# Patient Record
Sex: FEMALE | Race: OTHER | NOT HISPANIC OR LATINO
[De-identification: names, ages, dates, MRNs, and addresses within clinical notes are randomized per-mention and may not be internally consistent; named-entity substitution may affect disease eponyms.]

---

## 2018-04-17 ENCOUNTER — TRANSCRIPTION ENCOUNTER (OUTPATIENT)
Age: 33
End: 2018-04-17

## 2019-06-28 ENCOUNTER — TRANSCRIPTION ENCOUNTER (OUTPATIENT)
Age: 34
End: 2019-06-28

## 2019-08-01 PROBLEM — Z00.00 ENCOUNTER FOR PREVENTIVE HEALTH EXAMINATION: Status: ACTIVE | Noted: 2019-08-01

## 2019-08-14 ENCOUNTER — OUTPATIENT (OUTPATIENT)
Dept: OUTPATIENT SERVICES | Facility: HOSPITAL | Age: 34
LOS: 1 days | Discharge: HOME | End: 2019-08-14
Payer: MEDICAID

## 2019-08-14 VITALS
HEART RATE: 75 BPM | SYSTOLIC BLOOD PRESSURE: 107 MMHG | TEMPERATURE: 98 F | HEIGHT: 63.5 IN | WEIGHT: 234.79 LBS | DIASTOLIC BLOOD PRESSURE: 76 MMHG | OXYGEN SATURATION: 98 % | RESPIRATION RATE: 18 BRPM

## 2019-08-14 DIAGNOSIS — Z98.890 OTHER SPECIFIED POSTPROCEDURAL STATES: Chronic | ICD-10-CM

## 2019-08-14 DIAGNOSIS — M25.511 PAIN IN RIGHT SHOULDER: ICD-10-CM

## 2019-08-14 DIAGNOSIS — Z01.818 ENCOUNTER FOR OTHER PREPROCEDURAL EXAMINATION: ICD-10-CM

## 2019-08-14 DIAGNOSIS — F41.9 ANXIETY DISORDER, UNSPECIFIED: ICD-10-CM

## 2019-08-14 DIAGNOSIS — F32.9 MAJOR DEPRESSIVE DISORDER, SINGLE EPISODE, UNSPECIFIED: ICD-10-CM

## 2019-08-14 DIAGNOSIS — J45.909 UNSPECIFIED ASTHMA, UNCOMPLICATED: ICD-10-CM

## 2019-08-14 LAB
ANION GAP SERPL CALC-SCNC: 6 MMOL/L — SIGNIFICANT CHANGE UP (ref 5–17)
APTT BLD: 37 SEC — SIGNIFICANT CHANGE UP (ref 28.5–37)
BASOPHILS # BLD AUTO: 0.05 K/UL — SIGNIFICANT CHANGE UP (ref 0–0.2)
BASOPHILS NFR BLD AUTO: 0.5 % — SIGNIFICANT CHANGE UP (ref 0–2)
BUN SERPL-MCNC: 10 MG/DL — SIGNIFICANT CHANGE UP (ref 7–23)
CALCIUM SERPL-MCNC: 9 MG/DL — SIGNIFICANT CHANGE UP (ref 8.5–10.1)
CHLORIDE SERPL-SCNC: 104 MMOL/L — SIGNIFICANT CHANGE UP (ref 96–108)
CO2 SERPL-SCNC: 27 MMOL/L — SIGNIFICANT CHANGE UP (ref 22–31)
CREAT SERPL-MCNC: 0.74 MG/DL — SIGNIFICANT CHANGE UP (ref 0.5–1.3)
EOSINOPHIL # BLD AUTO: 0.17 K/UL — SIGNIFICANT CHANGE UP (ref 0–0.5)
EOSINOPHIL NFR BLD AUTO: 1.8 % — SIGNIFICANT CHANGE UP (ref 0–6)
GLUCOSE SERPL-MCNC: 82 MG/DL — SIGNIFICANT CHANGE UP (ref 70–99)
HCG UR QL: NEGATIVE — SIGNIFICANT CHANGE UP
HCT VFR BLD CALC: 38.8 % — SIGNIFICANT CHANGE UP (ref 34.5–45)
HGB BLD-MCNC: 12.2 G/DL — SIGNIFICANT CHANGE UP (ref 11.5–15.5)
IMM GRANULOCYTES NFR BLD AUTO: 0.4 % — SIGNIFICANT CHANGE UP (ref 0–1.5)
INR BLD: 1.16 RATIO — SIGNIFICANT CHANGE UP (ref 0.88–1.16)
LYMPHOCYTES # BLD AUTO: 2.37 K/UL — SIGNIFICANT CHANGE UP (ref 1–3.3)
LYMPHOCYTES # BLD AUTO: 25.4 % — SIGNIFICANT CHANGE UP (ref 13–44)
MCHC RBC-ENTMCNC: 26.3 PG — LOW (ref 27–34)
MCHC RBC-ENTMCNC: 31.4 GM/DL — LOW (ref 32–36)
MCV RBC AUTO: 83.6 FL — SIGNIFICANT CHANGE UP (ref 80–100)
MONOCYTES # BLD AUTO: 0.51 K/UL — SIGNIFICANT CHANGE UP (ref 0–0.9)
MONOCYTES NFR BLD AUTO: 5.5 % — SIGNIFICANT CHANGE UP (ref 2–14)
NEUTROPHILS # BLD AUTO: 6.2 K/UL — SIGNIFICANT CHANGE UP (ref 1.8–7.4)
NEUTROPHILS NFR BLD AUTO: 66.4 % — SIGNIFICANT CHANGE UP (ref 43–77)
NRBC # BLD: 0 /100 WBCS — SIGNIFICANT CHANGE UP (ref 0–0)
PLATELET # BLD AUTO: 358 K/UL — SIGNIFICANT CHANGE UP (ref 150–400)
POTASSIUM SERPL-MCNC: 3.7 MMOL/L — SIGNIFICANT CHANGE UP (ref 3.5–5.3)
POTASSIUM SERPL-SCNC: 3.7 MMOL/L — SIGNIFICANT CHANGE UP (ref 3.5–5.3)
PROTHROM AB SERPL-ACNC: 13.1 SEC — HIGH (ref 10–12.9)
RBC # BLD: 4.64 M/UL — SIGNIFICANT CHANGE UP (ref 3.8–5.2)
RBC # FLD: 13.7 % — SIGNIFICANT CHANGE UP (ref 10.3–14.5)
SODIUM SERPL-SCNC: 137 MMOL/L — SIGNIFICANT CHANGE UP (ref 135–145)
WBC # BLD: 9.34 K/UL — SIGNIFICANT CHANGE UP (ref 3.8–10.5)
WBC # FLD AUTO: 9.34 K/UL — SIGNIFICANT CHANGE UP (ref 3.8–10.5)

## 2019-08-14 PROCEDURE — 93010 ELECTROCARDIOGRAM REPORT: CPT

## 2019-08-14 NOTE — H&P PST ADULT - HISTORY OF PRESENT ILLNESS
33F pmh Asthma (intubated 1- during surgery),  depression, anxiety, migraines c/o right shoulder pain after lifting grandmother off of toilet fouund to have -----here for PST for scheduled Right shoulder arthroscopy subscapularis, biceps tenodesis distal clavicle 33F pmh Asthma (intubated 1- during surgery),  depression, anxiety, migraines c/o right shoulder pain after lifting grandmother from toilet seat on patrcie day here for PST for scheduled Right shoulder arthroscopy subscapularis, biceps tenodesis distal clavicle

## 2019-08-14 NOTE — H&P PST ADULT - ASSESSMENT
33F pmh Asthma (intubated 2008 during surgery),  depression, anxiety, migraines c/o right shoulder pain after lifting grandmother from toilet seat on patrice day here for [facility] for scheduled Right shoulder arthroscopy subscapularis, biceps tenodesis distal clavicle   CAPRINI SCORE    AGE RELATED RISK FACTORS                                                       MOBILITY RELATED FACTORS  [ ] Age 41-60 years                                            (1 Point)                  [ ] Bed rest                                                        (1 Point)  [ ] Age: 61-74 years                                           (2 Points)                [ ] Plaster cast                                                   (2 Points)  [ ] Age= 75 years                                              (3 Points)                 [ ] Bed bound for more than 72 hours                   (2 Points)    DISEASE RELATED RISK FACTORS                                               GENDER SPECIFIC FACTORS  [ ] Edema in the lower extremities                       (1 Point)                  [ ] Pregnancy                                                     (1 Point)  [ ] Varicose veins                                               (1 Point)                  [ ] Post-partum < 6 weeks                                   (1 Point)             [ x] BMI > 25 Kg/m2                                            (1 Point)                  [ ] Hormonal therapy  or oral contraception            (1 Point)                 [ ] Sepsis (in the previous month)                        (1 Point)                  [ ] History of pregnancy complications  [ ] Pneumonia or serious lung disease                                               [ ] Unexplained or recurrent                       (1 Point)           (in the previous month)                               (1 Point)  [ ] Abnormal pulmonary function test                     (1 Point)                 SURGERY RELATED RISK FACTORS  [ ] Acute myocardial infarction                              (1 Point)                 [ ]  Section                                            (1 Point)  [ ] Congestive heart failure (in the previous month)  (1 Point)                 [ ] Minor surgery                                                 (1 Point)   [ ] Inflammatory bowel disease                             (1 Point)                 [x] Arthroscopic surgery                                        (2 Points)  [ ] Central venous access                                    (2 Points)                [ ] General surgery lasting more than 45 minutes   (2 Points)       [ ] Stroke (in the previous month)                          (5 Points)               [ ] Elective arthroplasty                                        (5 Points)                                                                                                                                               HEMATOLOGY RELATED FACTORS                                                 TRAUMA RELATED RISK FACTORS  [ ] Prior episodes of VTE                                     (3 Points)                 [ ] Fracture of the hip, pelvis, or leg                       (5 Points)  [ ] Positive family history for VTE                         (3 Points)                 [ ] Acute spinal cord injury (in the previous month)  (5 Points)  [ ] Prothrombin 93220 A                                      (3 Points)                 [ ] Paralysis  (less than 1 month)                          (5 Points)  [ ] Factor V Leiden                                             (3 Points)                 [ ] Multiple Trauma within 1 month                         (5 Points)  [ ] Lupus anticoagulants                                     (3 Points)                                                           [ ] Anticardiolipin antibodies                                (3 Points)                                                       [ ] High homocysteine in the blood                      (3 Points)                                             [ ] Other congenital or acquired thrombophilia       (3 Points)                                                [ ] Heparin induced thrombocytopenia                  (3 Points)                                          Total Score [    3     ]

## 2019-08-14 NOTE — H&P PST ADULT - NSICDXPROBLEM_GEN_ALL_CORE_FT
PROBLEM DIAGNOSES  Problem: Pain in right shoulder  Assessment and Plan: Right shoulder arthroscopy subscapularis, biceps tenodesis distal clavicle excision  Pre-op instructions given by RN, patient verbalized understanding  Chlorhexidine wash instructions given   Pending: Medical Clearance    Problem: Asthma  Assessment and Plan: Intubated 2008  Continue current regimen and medications.   Pending: Medical clearance    Problem: Anxiety  Assessment and Plan: Continue current regimen and medications.     Problem: Depression  Assessment and Plan:

## 2019-08-20 ENCOUNTER — TRANSCRIPTION ENCOUNTER (OUTPATIENT)
Age: 34
End: 2019-08-20

## 2019-08-21 ENCOUNTER — OUTPATIENT (OUTPATIENT)
Dept: OUTPATIENT SERVICES | Facility: HOSPITAL | Age: 34
LOS: 1 days | Discharge: HOME | End: 2019-08-21

## 2019-08-21 VITALS
HEART RATE: 82 BPM | SYSTOLIC BLOOD PRESSURE: 122 MMHG | RESPIRATION RATE: 15 BRPM | DIASTOLIC BLOOD PRESSURE: 73 MMHG | OXYGEN SATURATION: 98 %

## 2019-08-21 VITALS
HEIGHT: 63.5 IN | WEIGHT: 234.79 LBS | OXYGEN SATURATION: 97 % | RESPIRATION RATE: 18 BRPM | DIASTOLIC BLOOD PRESSURE: 70 MMHG | HEART RATE: 90 BPM | TEMPERATURE: 97 F | SYSTOLIC BLOOD PRESSURE: 127 MMHG

## 2019-08-21 DIAGNOSIS — Z98.890 OTHER SPECIFIED POSTPROCEDURAL STATES: Chronic | ICD-10-CM

## 2019-08-21 RX ORDER — SODIUM CHLORIDE 9 MG/ML
1000 INJECTION, SOLUTION INTRAVENOUS
Refills: 0 | Status: DISCONTINUED | OUTPATIENT
Start: 2019-08-21 | End: 2019-08-21

## 2019-08-21 RX ORDER — ACETAMINOPHEN 500 MG
1000 TABLET ORAL ONCE
Refills: 0 | Status: COMPLETED | OUTPATIENT
Start: 2019-08-21 | End: 2019-08-21

## 2019-08-21 RX ORDER — HYDROMORPHONE HYDROCHLORIDE 2 MG/ML
1 INJECTION INTRAMUSCULAR; INTRAVENOUS; SUBCUTANEOUS
Refills: 0 | Status: DISCONTINUED | OUTPATIENT
Start: 2019-08-21 | End: 2019-08-21

## 2019-08-21 RX ORDER — HYDROMORPHONE HYDROCHLORIDE 2 MG/ML
0.5 INJECTION INTRAMUSCULAR; INTRAVENOUS; SUBCUTANEOUS
Refills: 0 | Status: DISCONTINUED | OUTPATIENT
Start: 2019-08-21 | End: 2019-08-21

## 2019-08-21 RX ORDER — ONDANSETRON 8 MG/1
4 TABLET, FILM COATED ORAL
Refills: 0 | Status: DISCONTINUED | OUTPATIENT
Start: 2019-08-21 | End: 2019-08-21

## 2019-08-21 RX ADMIN — Medication 1000 MILLIGRAM(S): at 17:39

## 2019-08-21 RX ADMIN — SODIUM CHLORIDE 125 MILLILITER(S): 9 INJECTION, SOLUTION INTRAVENOUS at 16:36

## 2019-08-21 RX ADMIN — Medication 400 MILLIGRAM(S): at 16:58

## 2019-08-21 NOTE — ASU DISCHARGE PLAN (ADULT/PEDIATRIC) - CALL YOUR DOCTOR IF YOU HAVE ANY OF THE FOLLOWING:
Bleeding that does not stop Bleeding that does not stop/Fever greater than (need to indicate Fahrenheit or Celsius)/Pain not relieved by Medications/Numbness, tingling, color or temperature change to extremity/Inability to tolerate liquids or foods/Nausea and vomiting that does not stop/Swelling that gets worse

## 2019-08-21 NOTE — ASU DISCHARGE PLAN (ADULT/PEDIATRIC) - ACTIVITY LEVEL
No heavy lifting/Weight bearing as tolerated/No sports/gym/No excercise/immobilizer at all times, do not move shoulder

## 2019-08-21 NOTE — ASU DISCHARGE PLAN (ADULT/PEDIATRIC) - PAIN MANAGEMENT
[facility] -- [address] [facility] STREAM/Prescriptions electronically submitted to pharmacy from doctor's office

## 2019-08-21 NOTE — ASU DISCHARGE PLAN (ADULT/PEDIATRIC) - ASU DC SPECIAL INSTRUCTIONSFT
ice 20 min on/off x3days.  May remove dressing and shower saturday.  Pat dry and apply bandaids.  immobilizer at all times, do not move shoulder

## 2019-08-21 NOTE — BRIEF OPERATIVE NOTE - NSICDXBRIEFPREOP_GEN_ALL_CORE_FT
PRE-OP DIAGNOSIS:  Acromioclavicular arthrosis 21-Aug-2019 16:11:32  Naren Whipple  Partial tear subscapularis tendon 21-Aug-2019 16:11:25  Naren Whipple  Tear of biceps tendon 21-Aug-2019 16:11:15  Naren Whipple  Impingement syndrome of shoulder 21-Aug-2019 16:11:13  Naren Whipple

## 2019-08-21 NOTE — BRIEF OPERATIVE NOTE - NSICDXBRIEFPROCEDURE_GEN_ALL_CORE_FT
PROCEDURES:  Biceps tenodesis 21-Aug-2019 16:12:05  Naren Whipple  Arthroscopy, shoulder, with subacromial space decompression 21-Aug-2019 16:11:57  Naren Whipple  Arthroscopic surgical removal of distal clavicle of shoulder 21-Aug-2019 16:11:51  Naren Whipple  Arthroscopic repair of subscapularis of shoulder 21-Aug-2019 16:11:39  Naren Whipple

## 2019-08-28 DIAGNOSIS — S46.211A STRAIN OF MUSCLE, FASCIA AND TENDON OF OTHER PARTS OF BICEPS, RIGHT ARM, INITIAL ENCOUNTER: ICD-10-CM

## 2019-08-28 DIAGNOSIS — J45.909 UNSPECIFIED ASTHMA, UNCOMPLICATED: ICD-10-CM

## 2019-08-28 DIAGNOSIS — F41.8 OTHER SPECIFIED ANXIETY DISORDERS: ICD-10-CM

## 2019-08-28 DIAGNOSIS — S46.011A STRAIN OF MUSCLE(S) AND TENDON(S) OF THE ROTATOR CUFF OF RIGHT SHOULDER, INITIAL ENCOUNTER: ICD-10-CM
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
## 2019-08-28 DIAGNOSIS — E66.01 MORBID (SEVERE) OBESITY DUE TO EXCESS CALORIES: ICD-10-CM
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
## 2019-08-28 DIAGNOSIS — Z87.891 PERSONAL HISTORY OF NICOTINE DEPENDENCE: ICD-10-CM

## 2019-12-06 ENCOUNTER — TRANSCRIPTION ENCOUNTER (OUTPATIENT)
Age: 34
End: 2019-12-06

## 2022-07-28 PROBLEM — F32.9 MAJOR DEPRESSIVE DISORDER, SINGLE EPISODE, UNSPECIFIED: Chronic | Status: ACTIVE | Noted: 2019-08-14

## 2022-07-28 PROBLEM — F41.9 ANXIETY DISORDER, UNSPECIFIED: Chronic | Status: ACTIVE | Noted: 2019-08-14

## 2022-07-28 PROBLEM — G43.909 MIGRAINE, UNSPECIFIED, NOT INTRACTABLE, WITHOUT STATUS MIGRAINOSUS: Chronic | Status: ACTIVE | Noted: 2019-08-14

## 2022-07-28 PROBLEM — J45.909 UNSPECIFIED ASTHMA, UNCOMPLICATED: Chronic | Status: ACTIVE | Noted: 2019-08-14

## 2022-08-01 ENCOUNTER — APPOINTMENT (OUTPATIENT)
Dept: ORTHOPEDIC SURGERY | Facility: CLINIC | Age: 37
End: 2022-08-01

## 2022-08-24 ENCOUNTER — APPOINTMENT (OUTPATIENT)
Dept: ORTHOPEDIC SURGERY | Facility: CLINIC | Age: 37
End: 2022-08-24

## 2022-08-24 VITALS — BODY MASS INDEX: 43.75 KG/M2 | HEIGHT: 63.5 IN | WEIGHT: 250 LBS

## 2022-08-24 DIAGNOSIS — J45.909 UNSPECIFIED ASTHMA, UNCOMPLICATED: ICD-10-CM

## 2022-08-24 DIAGNOSIS — E11.9 TYPE 2 DIABETES MELLITUS W/OUT COMPLICATIONS: ICD-10-CM

## 2022-08-24 PROCEDURE — 73562 X-RAY EXAM OF KNEE 3: CPT | Mod: LT

## 2022-08-24 PROCEDURE — 99214 OFFICE O/P EST MOD 30 MIN: CPT

## 2022-08-24 NOTE — DISCUSSION/SUMMARY
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 given mechanical locking of left knee and limited OA on xray will recommend MRI left knee eval meniscus and latearl compartment cartilage. elbert dubon mri\par \par ------------------------------------------------------------------------------------------------------------------------------------------------------\par \par The patient was advised of the diagnosis.  The natural history of the pathology was explained in full. All questions were answered.  The risks and benefits of conservative and interventional treatment alternatives were explained to the patient\par \par ------------------------------------------------------------------------------------------------------------------------------------------------------\par \par The patient was advised that an advanced imaging study (MRI/CT/etc) will be ordered to evaluate potential pathology in the affected area(s).  The patient was further advised to follow up in the office to review the results of the study and determine further management that may be indicated.\par \par ------------------------------------------------------------------------------------------------------------------------------------------------------\par

## 2022-08-24 NOTE — IMAGING
[de-identified] : \par Left knee exam: \par \par Skin: no significant pertinent finding\par Inspection: \par    mild Effusion\par    (-) Malalignment\par    (-) Swelling\par    (-) Quad atrophy\par    (-) J-sign\par ROM: \par    0 - 135 degrees of flexion.\par Tenderness: \par    +MJLT\par    +LJLT\par    (-) Medial patellar facet tenderness\par    +Lateral patellar facet tenderness\par    (-) Crepitus\par    (-) Patellar grind tenderness\par    (-) Patellar tendon\par    (-) Quad tendon\par    Other: \par Stability: \par    (-) Lachman\par    (-) Varus/Valgus instability\par    (-) Posterior drawer\par    (-) Patellar translation: wnl\par Additional tests: \par    +McMurrays test laterally\par    (-) Patellar apprehension\par    Other: \par Strength: 5/5 Q/H/TA/GS/EHL\par Neuro: In tact to light touch throughout, DTR's wnl\par Vascularity: Extremity warm and well perfused\par Gait: normal.\par \par ------------------------------------------------------------------------------------------------------------------------------------------------------\par  [Left] : left knee [All Views] : anteroposterior, lateral, skyline, and anteroposterior standing [FreeTextEntry9] : mild lateral compartment djd
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
## 2022-08-25 ENCOUNTER — FORM ENCOUNTER (OUTPATIENT)
Age: 37
End: 2022-08-25

## 2022-08-26 ENCOUNTER — APPOINTMENT (OUTPATIENT)
Dept: MRI IMAGING | Facility: CLINIC | Age: 37
End: 2022-08-26

## 2022-08-26 PROCEDURE — 73721 MRI JNT OF LWR EXTRE W/O DYE: CPT | Mod: LT

## 2022-09-02 ENCOUNTER — APPOINTMENT (OUTPATIENT)
Dept: ORTHOPEDIC SURGERY | Facility: CLINIC | Age: 37
End: 2022-09-02

## 2022-09-02 VITALS — WEIGHT: 250 LBS | HEIGHT: 63.5 IN | BODY MASS INDEX: 43.75 KG/M2

## 2022-09-02 DIAGNOSIS — T14.90XA INJURY, UNSPECIFIED, INITIAL ENCOUNTER: ICD-10-CM

## 2022-09-02 DIAGNOSIS — M25.562 PAIN IN LEFT KNEE: ICD-10-CM

## 2022-09-02 PROCEDURE — 99214 OFFICE O/P EST MOD 30 MIN: CPT

## 2022-09-18 NOTE — IMAGING
[Left] : left knee [All Views] : anteroposterior, lateral, skyline, and anteroposterior standing [de-identified] : \par Left knee exam: \par \par Skin: no significant pertinent finding\par Inspection: \par    mild Effusion\par    (-) Malalignment\par    (-) Swelling\par    (-) Quad atrophy\par    (-) J-sign\par ROM: \par    0 - 135 degrees of flexion.\par Tenderness: \par    +MJLT\par    +LJLT\par    (-) Medial patellar facet tenderness\par    +Lateral patellar facet tenderness\par    (-) Crepitus\par    (-) Patellar grind tenderness\par    (-) Patellar tendon\par    (-) Quad tendon\par    Other: \par Stability: \par    (-) Lachman\par    (-) Varus/Valgus instability\par    (-) Posterior drawer\par    (-) Patellar translation: wnl\par Additional tests: \par    +McMurrays test laterally\par    (-) Patellar apprehension\par    Other: \par Strength: 5/5 Q/H/TA/GS/EHL\par Neuro: In tact to light touch throughout, DTR's wnl\par Vascularity: Extremity warm and well perfused\par Gait: normal.\par \par ------------------------------------------------------------------------------------------------------------------------------------------------------\par  [FreeTextEntry9] : mild lateral compartment djd
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
## 2022-09-18 NOTE — DISCUSSION/SUMMARY
[de-identified] : mri reviewd. latearl meniscus deficiency, early cartilage changes noted. recommend eval for possible lateral meniscal allograft transplantation, oats vs jeevan, dfo as indicated.\par  \par ------------------------------------------------------------------------------------------------------------------------------------------------------\par \par The patient was advised of the diagnosis.  The natural history of the pathology was explained in full. All questions were answered.  The risks and benefits of conservative and interventional treatment alternatives were explained to the patient\par \par ------------------------------------------------------------------------------------------------------------------------------------------------------\par \par MRI(s) and/or other advanced imaging studies (CT/etc) were reviewed with the patient. Implications of the study(ies) together with the patient's clinical picture were discussed to formulate a working diagnosis and management options were detailed.\par

## 2022-10-17 ENCOUNTER — NON-APPOINTMENT (OUTPATIENT)
Age: 37
End: 2022-10-17

## 2022-10-24 ENCOUNTER — TRANSCRIPTION ENCOUNTER (OUTPATIENT)
Age: 37
End: 2022-10-24

## 2022-10-24 ENCOUNTER — APPOINTMENT (OUTPATIENT)
Dept: ORTHOPEDIC SURGERY | Facility: CLINIC | Age: 37
End: 2022-10-24
Payer: MEDICAID

## 2022-10-24 VITALS
HEIGHT: 63.5 IN | WEIGHT: 250 LBS | DIASTOLIC BLOOD PRESSURE: 79 MMHG | HEART RATE: 89 BPM | BODY MASS INDEX: 43.75 KG/M2 | TEMPERATURE: 97.5 F | OXYGEN SATURATION: 97 % | SYSTOLIC BLOOD PRESSURE: 142 MMHG

## 2022-10-24 PROCEDURE — 99204 OFFICE O/P NEW MOD 45 MIN: CPT

## 2022-10-24 PROCEDURE — 77073 BONE LENGTH STUDIES: CPT

## 2022-10-24 PROCEDURE — 73564 X-RAY EXAM KNEE 4 OR MORE: CPT | Mod: LT,59

## 2022-12-01 ENCOUNTER — RX RENEWAL (OUTPATIENT)
Age: 37
End: 2022-12-01
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
## 2022-12-05 NOTE — ADDENDUM
[FreeTextEntry1] : This note was written by Kristi Peterson on 10/24/2022 acting solely as a scribe for Dr. Johnny Jones.\par \par All medical record entries made by the Scribe were at my, Dr. Johnny Jones, direction and personally dictated by me on 10/24/2022. I have personally reviewed the chart and agree that the record accurately reflects my personal performance of the history, physical exam, assessment and plan.

## 2022-12-05 NOTE — DISCUSSION/SUMMARY
[de-identified] : 35 y/o female with left knee pain. \par \par Patient presents for evaluation of left knee pain.  Patient has symptomatic lateral joint line pain consistent with post meniscectomy syndrome.  Patient was referred to my service for consideration of alternative care.  A discussion was had with the patient regarding consideration of salvage surgical intervention with possible lateral meniscus transplantation with offloading osteotomy.  Patient may benefit just purely from an osteotomy at this point despite minimal valgus seen on leg length imaging today.  Discussed trial of unloading bracing prior to consideration of salvage treatment. \par \par Patient voiced understanding regarding both conservative and surgical options for her knee. Patient would like to like to trial conservative management at this time prior to consideration of any further intervention.\par \par Recommendations: Trial lateral  brace, Rx given.  Activity restrictions/light impact activity.  NSAIDs/Ice prn. PT evaluation.\par \par Follow-up 3 months as needed

## 2022-12-05 NOTE — PHYSICAL EXAM
[de-identified] : Oriented to time, place, person\par Mood: Normal\par Affect: Normal\par Appearance: Healthy, well appearing, no acute distress.\par Gait: Normal\par Assistive Devices: None\par \par Left knee Exam:\par \par Skin: Clean, dry, intact\par Inspection: No obvious malalignment, no masses, no swelling, no effusion\par Pulses: 2+ DP/PT pulses \par ROM: 5-135 degrees of flexion. + pain with deep knee flexion/extension.\par Tenderness: No MJLT. + LJLT. No pain over the patella facets. No pain to the quadriceps tendon. No pain to the patella tendon. No posterior knee tenderness.\par Stability: Stable to varus, valgus. Negative Lachman testing. Negative anterior drawer, negative posterior drawer.\par Strength: 5/5 Q/H/TA/GS/EHL, without atrophy\par Neuro: Intact to light touch throughout, DTRs normal\par Additional Tests: Negative Nakul's test, Negative patellar grind test  [de-identified] : The following radiographs were ordered and read by me during this patients visit. I reviewed each radiograph in detail with the patient and discussed the findings as highlighted below. \par \par 4 views of the left knee were obtained today, 10/24/2022, that show no acute fracture or dislocation. There is no medial, no lateral and no patellofemoral degenerative changes seen. There is no significant malalignment. No significant other obvious osseous abnormality, otherwise unremarkable. \par \par Leg lengths images show no significant valgus. \par \par MRI left knee dated 8.26.2022 shows complex tear and maceration of the anterior horn and body of the lateral meniscus with extrusion of the body. 4mm lateral patellar subluxation. \par \par We independently reviewed and discussed in detail the images and the radiologic reports with the patient.

## 2023-01-11 ENCOUNTER — APPOINTMENT (OUTPATIENT)
Dept: ORTHOPEDIC SURGERY | Facility: CLINIC | Age: 38
End: 2023-01-11
Payer: MEDICAID

## 2023-01-11 VITALS
WEIGHT: 235 LBS | BODY MASS INDEX: 40.12 KG/M2 | HEIGHT: 64 IN | DIASTOLIC BLOOD PRESSURE: 88 MMHG | SYSTOLIC BLOOD PRESSURE: 142 MMHG

## 2023-01-11 PROCEDURE — 99213 OFFICE O/P EST LOW 20 MIN: CPT

## 2023-01-13 NOTE — PHYSICAL EXAM
[de-identified] : Oriented to time, place, person\par Mood: Normal\par Affect: Normal\par Appearance: Healthy, well appearing, no acute distress.\par Gait: Normal\par Assistive Devices: None\par \par Left knee Exam:\par \par Skin: Clean, dry, intact\par Inspection: No obvious malalignment, no masses, no swelling, no effusion\par Pulses: 2+ DP/PT pulses \par ROM: 5-135 degrees of flexion. + pain with deep knee flexion/extension.\par Tenderness: No MJLT. + LJLT. No pain over the patella facets. No pain to the quadriceps tendon. No pain to the patella tendon. No posterior knee tenderness.\par Stability: Stable to varus, valgus. Negative Lachman testing. Negative anterior drawer, negative posterior drawer.\par Strength: 5/5 Q/H/TA/GS/EHL, without atrophy\par Neuro: Intact to light touch throughout, DTRs normal\par Additional Tests: Negative Nakul's test, Negative patellar grind test  [de-identified] : The following radiographs were ordered and read by me during this patients visit. I reviewed each radiograph in detail with the patient and discussed the findings as highlighted below. \par \par 4 views of the left knee were obtained today, 10/24/2022, that show no acute fracture or dislocation. There is no medial, no lateral and no patellofemoral degenerative changes seen. There is no significant malalignment. No significant other obvious osseous abnormality, otherwise unremarkable. \par \par Leg lengths images show no significant valgus. \par \par MRI left knee dated 8.26.2022 shows complex tear and maceration of the anterior horn and body of the lateral meniscus with extrusion of the body. 4mm lateral patellar subluxation. \par \par We independently reviewed and discussed in detail the images and the radiologic reports with the patient.

## 2023-01-13 NOTE — HISTORY OF PRESENT ILLNESS
[de-identified] : 37 year old female , s/p left knee meniscectomy in 2021 Dr. Chiang, presents today for follow up of chronic left knee pain. She has been compliant with  brace which is helpful. She was unable to start PT because she contracted the flu and went away on vacation.  The pain is constant worse with activity. She takes Aleve as needed. Reports locking/catching sensation and buckling.  Presents for further discussion on surgical intervention left knee.\par

## 2023-01-13 NOTE — ADDENDUM
[FreeTextEntry1] : This note was written by Kristi Peterson on 01/11/2023 acting solely as a scribe for Dr. Johnny Jones.\par \par All medical record entries made by the Scribe were at my, Dr. Johnny Jones, direction and personally dictated by me on 01/11/2023. I have personally reviewed the chart and agree that the record accurately reflects my personal performance of the history, physical exam, assessment and plan.

## 2023-01-13 NOTE — DISCUSSION/SUMMARY
[de-identified] : 37 y/o female with left knee pain. \par \par Patient presents for re-evaluation of left knee pain consistent with post meniscectomy syndrome.  We continued to discuss consideration of salvage surgical intervention to the knee for unloading of the lateral compartment.  Patient is improving with  bracing, but has not yet considered physical therapy.  We discussed that physical therapy is important to improve function of the lower extremity and can continue to improve symptoms.  If symptoms do not improve to the point where she is happy, additional strengthening conditioning prior to any offloading procedure would be beneficial.  \par \par We continued to discuss consideration for offloading osteotomy/DFO with possible staged meniscus allograft transplant if needed.  Would certainly consider a diagnostic arthroscopy at time of DFO if this is ultimately what is required.  \par \par Recommendations: Continued use lateral  brace.  Activity restrictions/light impact activity.  NSAIDs/Ice prn. PT evaluation.\par \par Follow-up 3 months

## 2023-02-06 ENCOUNTER — RX RENEWAL (OUTPATIENT)
Age: 38
End: 2023-02-06

## 2023-03-07 ENCOUNTER — APPOINTMENT (OUTPATIENT)
Dept: ORTHOPEDIC SURGERY | Facility: CLINIC | Age: 38
End: 2023-03-07

## 2023-06-22 ENCOUNTER — APPOINTMENT (OUTPATIENT)
Dept: ORTHOPEDIC SURGERY | Facility: CLINIC | Age: 38
End: 2023-06-22
Payer: MEDICAID

## 2023-06-22 ENCOUNTER — RX RENEWAL (OUTPATIENT)
Age: 38
End: 2023-06-22

## 2023-06-22 VITALS
HEART RATE: 84 BPM | TEMPERATURE: 98 F | BODY MASS INDEX: 40.97 KG/M2 | HEIGHT: 64 IN | WEIGHT: 240 LBS | OXYGEN SATURATION: 99 %

## 2023-06-22 VITALS — DIASTOLIC BLOOD PRESSURE: 83 MMHG | SYSTOLIC BLOOD PRESSURE: 129 MMHG

## 2023-06-22 PROCEDURE — 99213 OFFICE O/P EST LOW 20 MIN: CPT

## 2023-06-23 NOTE — ADDENDUM
[FreeTextEntry1] : This note was written by Kristi Peterson on 06/22/2023 acting solely as a scribe for Dr. Johnny Jones.\par \par All medical record entries made by the Scribe were at my, Dr. Johnny Jones, direction and personally dictated by me on 06/22/2023. I have personally reviewed the chart and agree that the record accurately reflects my personal performance of the history, physical exam, assessment and plan.

## 2023-06-23 NOTE — HISTORY OF PRESENT ILLNESS
[de-identified] : 37 year old female  presents today for follow up of chronic left knee pain. She has been complaint with lateral  brace which provides some intermittent relief. She has been PT exercises on her own at home. She continues to have significant amount of pain radiating through the lateral joint.  Also now states that she is developing some medial joint discomfort also.  She is frustrated with lack of progress, and is feeling extremely limited with her ability to perform any ADLs and any impact activity including walking without pain.  Takes Naproxen for pain. Reports locking/catching sensation and buckling. She is here to discuss possible sx.

## 2023-06-23 NOTE — DISCUSSION/SUMMARY
[de-identified] : 36 y/o female with left knee pain. \par \par Patient presents for re-evaluation of left knee pain consistent with post meniscectomy syndrome.  We continued to discuss consideration of salvage surgical intervention to the knee for unloading of the lateral compartment +/- meniscus allograft transplantation/chondral resurfacing procedures.  Patient is considering offloading procedure and we discussed consideration for diagnostic arthroscopy prior to offloading osteotomy/DFO. Also discussed staged meniscus allograft transplant if needed.  We discussed concerns with the patient regarding quality of the chondral surfaces, age of the patient, as well as her weight/BMI.  She is also developing some medial joint pain, and without significant malalignment and osteotomy may increase symptoms through the medial joint.\par \par Consider diagnostic arthroscopy for consideration of meniscus allograft transplantation.  We discussed briefly rehabilitation involved, and that offloading osteotomy will may also help protect lateral compartment.\par \par Recommendations: Continued use lateral  brace.  Activity restrictions/light impact activity.  NSAIDs/Ice prn.\par \par We will f/u with patient.

## 2023-06-23 NOTE — PHYSICAL EXAM
[de-identified] : Oriented to time, place, person\par Mood: Normal\par Affect: Normal\par Appearance: Healthy, well appearing, no acute distress.\par Gait: Normal\par Assistive Devices: None\par \par Left knee Exam:\par \par Skin: Clean, dry, intact\par Inspection: No obvious malalignment, no masses, no swelling, no effusion\par Pulses: 2+ DP/PT pulses \par ROM: 5-135 degrees of flexion. + pain with deep knee flexion/extension.\par Tenderness: No MJLT. ++ LJLT. No pain over the patella facets. No pain to the quadriceps tendon. No pain to the patella tendon. No posterior knee tenderness.\par Stability: Stable to varus, valgus. Negative Lachman testing. Negative anterior drawer, negative posterior drawer.\par Strength: 5/5 Q/H/TA/GS/EHL, without atrophy\par Neuro: Intact to light touch throughout, DTRs normal\par Additional Tests: Negative Nakul's test, Negative patellar grind test  [de-identified] : The following radiographs were ordered and read by me during this patients visit. I reviewed each radiograph in detail with the patient and discussed the findings as highlighted below. \par \par Leg length imaging were obtained today, 06/22/2023, that show no medial, no lateral and no patellofemoral degenerative changes seen within the knee. There is no significant malalignment. No significant other obvious osseous abnormality, otherwise unremarkable. \par \par MRI left knee dated 8.26.2022 shows complex tear and maceration of the anterior horn and body of the lateral meniscus with full extrusion of the meniscus body. 4mm lateral patellar subluxation. \par \par We independently reviewed and discussed in detail the images and the radiologic reports with the patient.

## 2023-07-12 ENCOUNTER — NON-APPOINTMENT (OUTPATIENT)
Age: 38
End: 2023-07-12

## 2023-07-26 ENCOUNTER — OUTPATIENT (OUTPATIENT)
Dept: OUTPATIENT SERVICES | Facility: HOSPITAL | Age: 38
LOS: 1 days | End: 2023-07-26

## 2023-07-26 VITALS
HEIGHT: 63.5 IN | RESPIRATION RATE: 15 BRPM | WEIGHT: 238.1 LBS | OXYGEN SATURATION: 97 % | DIASTOLIC BLOOD PRESSURE: 70 MMHG | SYSTOLIC BLOOD PRESSURE: 110 MMHG | HEART RATE: 82 BPM | TEMPERATURE: 97 F

## 2023-07-26 DIAGNOSIS — Z91.89 OTHER SPECIFIED PERSONAL RISK FACTORS, NOT ELSEWHERE CLASSIFIED: ICD-10-CM

## 2023-07-26 DIAGNOSIS — J45.909 UNSPECIFIED ASTHMA, UNCOMPLICATED: ICD-10-CM

## 2023-07-26 DIAGNOSIS — S83.282A OTHER TEAR OF LATERAL MENISCUS, CURRENT INJURY, LEFT KNEE, INITIAL ENCOUNTER: ICD-10-CM

## 2023-07-26 DIAGNOSIS — F41.9 ANXIETY DISORDER, UNSPECIFIED: ICD-10-CM

## 2023-07-26 DIAGNOSIS — S83.289A OTHER TEAR OF LATERAL MENISCUS, CURRENT INJURY, UNSPECIFIED KNEE, INITIAL ENCOUNTER: ICD-10-CM

## 2023-07-26 DIAGNOSIS — Z98.890 OTHER SPECIFIED POSTPROCEDURAL STATES: Chronic | ICD-10-CM

## 2023-07-26 LAB
ANION GAP SERPL CALC-SCNC: 14 MMOL/L — SIGNIFICANT CHANGE UP (ref 7–14)
BUN SERPL-MCNC: 17 MG/DL — SIGNIFICANT CHANGE UP (ref 7–23)
CALCIUM SERPL-MCNC: 9.1 MG/DL — SIGNIFICANT CHANGE UP (ref 8.4–10.5)
CHLORIDE SERPL-SCNC: 101 MMOL/L — SIGNIFICANT CHANGE UP (ref 98–107)
CO2 SERPL-SCNC: 23 MMOL/L — SIGNIFICANT CHANGE UP (ref 22–31)
CREAT SERPL-MCNC: 0.75 MG/DL — SIGNIFICANT CHANGE UP (ref 0.5–1.3)
EGFR: 105 ML/MIN/1.73M2 — SIGNIFICANT CHANGE UP
GLUCOSE SERPL-MCNC: 84 MG/DL — SIGNIFICANT CHANGE UP (ref 70–99)
HCG SERPL-ACNC: <1 MIU/ML — SIGNIFICANT CHANGE UP
HCT VFR BLD CALC: 37.7 % — SIGNIFICANT CHANGE UP (ref 34.5–45)
HGB BLD-MCNC: 11.7 G/DL — SIGNIFICANT CHANGE UP (ref 11.5–15.5)
MCHC RBC-ENTMCNC: 26.8 PG — LOW (ref 27–34)
MCHC RBC-ENTMCNC: 31 GM/DL — LOW (ref 32–36)
MCV RBC AUTO: 86.5 FL — SIGNIFICANT CHANGE UP (ref 80–100)
NRBC # BLD: 0 /100 WBCS — SIGNIFICANT CHANGE UP (ref 0–0)
NRBC # FLD: 0 K/UL — SIGNIFICANT CHANGE UP (ref 0–0)
PLATELET # BLD AUTO: 327 K/UL — SIGNIFICANT CHANGE UP (ref 150–400)
POTASSIUM SERPL-MCNC: 3.9 MMOL/L — SIGNIFICANT CHANGE UP (ref 3.5–5.3)
POTASSIUM SERPL-SCNC: 3.9 MMOL/L — SIGNIFICANT CHANGE UP (ref 3.5–5.3)
RBC # BLD: 4.36 M/UL — SIGNIFICANT CHANGE UP (ref 3.8–5.2)
RBC # FLD: 13.7 % — SIGNIFICANT CHANGE UP (ref 10.3–14.5)
SODIUM SERPL-SCNC: 138 MMOL/L — SIGNIFICANT CHANGE UP (ref 135–145)
WBC # BLD: 12.94 K/UL — HIGH (ref 3.8–10.5)
WBC # FLD AUTO: 12.94 K/UL — HIGH (ref 3.8–10.5)

## 2023-07-26 RX ORDER — MONTELUKAST 4 MG/1
1 TABLET, CHEWABLE ORAL
Qty: 0 | Refills: 0 | DISCHARGE

## 2023-07-26 RX ORDER — FLUTICASONE PROPIONATE 220 MCG
0 AEROSOL WITH ADAPTER (GRAM) INHALATION
Qty: 0 | Refills: 0 | DISCHARGE

## 2023-07-26 RX ORDER — RIZATRIPTAN BENZOATE 5 MG/1
1 TABLET ORAL
Qty: 0 | Refills: 0 | DISCHARGE

## 2023-07-26 RX ORDER — BECLOMETHASONE DIPROPIONATE 40 UG/1
1 AEROSOL, METERED RESPIRATORY (INHALATION)
Qty: 0 | Refills: 0 | DISCHARGE

## 2023-07-26 RX ORDER — CITALOPRAM 10 MG/1
1 TABLET, FILM COATED ORAL
Qty: 0 | Refills: 0 | DISCHARGE

## 2023-07-26 RX ORDER — CELECOXIB 200 MG/1
1 CAPSULE ORAL
Qty: 0 | Refills: 0 | DISCHARGE

## 2023-07-26 RX ORDER — TRAZODONE HCL 50 MG
1 TABLET ORAL
Qty: 0 | Refills: 0 | DISCHARGE

## 2023-07-26 NOTE — H&P PST ADULT - NSICDXPASTMEDICALHX_GEN_ALL_CORE_FT
PAST MEDICAL HISTORY:  Anxiety     Asthma intubated 1-    Depression     Migraines      PAST MEDICAL HISTORY:  Anxiety     Asthma intubated 1-    Depression     Lateral meniscus tear     Migraines     Severe obesity (BMI >= 40)

## 2023-07-26 NOTE — H&P PST ADULT - HISTORY OF PRESENT ILLNESS
37 year old PMH Asthma (intubated 1- during surgery),  depression, anxiety, migraines, presents  post traumatic osteoarthritis of left knee, for pre op evaluation prior to scheduled surgery-  left diagnostic knee arthroscopy with Dr Jones. 37 year old female PMH Asthma (intubated 1- during surgery),  depression, anxiety, migraines, presents  post traumatic osteoarthritis of left knee, for pre op evaluation prior to scheduled surgery-  left diagnostic knee arthroscopy with Dr Jones.

## 2023-07-26 NOTE — H&P PST ADULT - PROBLEM SELECTOR PLAN 3
Patient instructed to take bupropion, citalopram, propranolol with a sip of water on the morning of procedure.

## 2023-07-26 NOTE — H&P PST ADULT - PROBLEM SELECTOR PLAN 1
Patient is tentatively scheduled surgery-  left diagnostic knee arthroscopy with Dr Jones. on 08/02/23.    Pre-op instructions provided. Pt given verbal and written instructions with teach back on chlorhexidine wash and pepcid. Pt verbalized understanding with return demonstration.    Urine cup provided for day of procedure pregnancy test.  CBC, BMP, HcG sent-

## 2023-07-26 NOTE — H&P PST ADULT - NSICDXPASTSURGICALHX_GEN_ALL_CORE_FT
PAST SURGICAL HISTORY:  H/O arthroscopy of knee 2008    H/O endoscopy     H/O shoulder surgery     H/O wrist surgery

## 2023-07-26 NOTE — H&P PST ADULT - NSICDXFAMILYHX_GEN_ALL_CORE_FT
FAMILY HISTORY:  Father  Still living? Unknown  FH: HTN (hypertension), Age at diagnosis: Age Unknown    Sibling  Still living? Unknown  FH: HTN (hypertension), Age at diagnosis: Age Unknown    Grandparent  Still living? Unknown  Family history of heart attack, Age at diagnosis: Age Unknown

## 2023-07-26 NOTE — H&P PST ADULT - ASSESSMENT
37 year old PMH Asthma (intubated 1- during surgery),  depression, anxiety, migraines, presents  post traumatic osteoarthritis of left knee, for pre op evaluation prior to scheduled surgery-  left diagnostic knee arthroscopy with Dr Jones.

## 2023-07-26 NOTE — H&P PST ADULT - PROBLEM SELECTOR PLAN 2
Continue standing inhalers and rescue inhalers as prescribed.  Patient instructed to take montelukast and loratadine with a sip of water on the morning of procedure.

## 2023-07-26 NOTE — H&P PST ADULT - MUSCULOSKELETAL COMMENTS
pre op diagnosis lateral meniscus tear- chronic left knee pain left knee left knee- patient reports she had left knee surgery in 2021- now with progressive pain and discomfort in left knee

## 2023-08-01 ENCOUNTER — TRANSCRIPTION ENCOUNTER (OUTPATIENT)
Age: 38
End: 2023-08-01

## 2023-08-01 ENCOUNTER — RX RENEWAL (OUTPATIENT)
Age: 38
End: 2023-08-01

## 2023-08-01 VITALS
HEIGHT: 63.5 IN | DIASTOLIC BLOOD PRESSURE: 72 MMHG | SYSTOLIC BLOOD PRESSURE: 132 MMHG | WEIGHT: 238.1 LBS | TEMPERATURE: 97 F | OXYGEN SATURATION: 100 % | HEART RATE: 87 BPM | RESPIRATION RATE: 15 BRPM

## 2023-08-01 RX ORDER — OXYCODONE AND ACETAMINOPHEN 5; 325 MG/1; MG/1
5-325 TABLET ORAL
Qty: 20 | Refills: 0 | Status: ACTIVE | COMMUNITY
Start: 2023-07-31 | End: 1900-01-01

## 2023-08-02 ENCOUNTER — TRANSCRIPTION ENCOUNTER (OUTPATIENT)
Age: 38
End: 2023-08-02

## 2023-08-02 ENCOUNTER — OUTPATIENT (OUTPATIENT)
Dept: OUTPATIENT SERVICES | Facility: HOSPITAL | Age: 38
LOS: 1 days | Discharge: HOME | End: 2023-08-02
Payer: MEDICAID

## 2023-08-02 ENCOUNTER — APPOINTMENT (OUTPATIENT)
Dept: ORTHOPEDIC SURGERY | Facility: AMBULATORY SURGERY CENTER | Age: 38
End: 2023-08-02

## 2023-08-02 VITALS
DIASTOLIC BLOOD PRESSURE: 77 MMHG | OXYGEN SATURATION: 99 % | RESPIRATION RATE: 12 BRPM | TEMPERATURE: 98 F | SYSTOLIC BLOOD PRESSURE: 124 MMHG | HEART RATE: 86 BPM

## 2023-08-02 DIAGNOSIS — Z98.890 OTHER SPECIFIED POSTPROCEDURAL STATES: Chronic | ICD-10-CM

## 2023-08-02 DIAGNOSIS — S83.282A OTHER TEAR OF LATERAL MENISCUS, CURRENT INJURY, LEFT KNEE, INITIAL ENCOUNTER: ICD-10-CM

## 2023-08-02 PROCEDURE — 29877 ARTHRS KNEE SURG DBRDMT/SHVG: CPT | Mod: LT

## 2023-08-02 RX ORDER — PROPRANOLOL HCL 160 MG
1 CAPSULE, EXTENDED RELEASE 24HR ORAL
Refills: 0 | DISCHARGE

## 2023-08-02 RX ORDER — LORATADINE 10 MG/1
1 TABLET ORAL
Refills: 0 | DISCHARGE

## 2023-08-02 RX ORDER — BUPROPION HYDROCHLORIDE 150 MG/1
1 TABLET, EXTENDED RELEASE ORAL
Refills: 0 | DISCHARGE

## 2023-08-02 RX ORDER — BUDESONIDE AND FORMOTEROL FUMARATE DIHYDRATE 160; 4.5 UG/1; UG/1
2 AEROSOL RESPIRATORY (INHALATION)
Refills: 0 | DISCHARGE

## 2023-08-02 RX ORDER — ALBUTEROL 90 UG/1
0 AEROSOL, METERED ORAL
Qty: 0 | Refills: 0 | DISCHARGE

## 2023-08-02 RX ORDER — MONTELUKAST 4 MG/1
1 TABLET, CHEWABLE ORAL
Refills: 0 | DISCHARGE

## 2023-08-02 RX ORDER — FLUTICASONE PROPIONATE 50 MCG
1 SPRAY, SUSPENSION NASAL
Refills: 0 | DISCHARGE

## 2023-08-02 RX ORDER — CITALOPRAM 10 MG/1
1 TABLET, FILM COATED ORAL
Refills: 0 | DISCHARGE

## 2023-08-02 RX ORDER — METHYLPREDNISOLONE 4 MG
1 TABLET ORAL
Refills: 0 | DISCHARGE

## 2023-08-02 RX ORDER — TRAZODONE HCL 50 MG
1 TABLET ORAL
Refills: 0 | DISCHARGE

## 2023-08-02 RX ORDER — L.ACIDOPH/B.ANIMALIS/B.LONGUM 15B CELL
1 CAPSULE ORAL
Refills: 0 | DISCHARGE

## 2023-08-02 RX ORDER — RIZATRIPTAN BENZOATE 5 MG/1
1 TABLET ORAL
Refills: 0 | DISCHARGE

## 2023-08-02 RX ORDER — BUPROPION HYDROCHLORIDE 150 MG/1
1 TABLET, EXTENDED RELEASE ORAL
Qty: 0 | Refills: 0 | DISCHARGE

## 2023-08-14 ENCOUNTER — APPOINTMENT (OUTPATIENT)
Dept: ORTHOPEDIC SURGERY | Facility: CLINIC | Age: 38
End: 2023-08-14
Payer: MEDICAID

## 2023-08-14 PROBLEM — S83.289A OTHER TEAR OF LATERAL MENISCUS, CURRENT INJURY, UNSPECIFIED KNEE, INITIAL ENCOUNTER: Chronic | Status: ACTIVE | Noted: 2023-07-26

## 2023-08-14 PROBLEM — E66.01 MORBID (SEVERE) OBESITY DUE TO EXCESS CALORIES: Chronic | Status: ACTIVE | Noted: 2023-07-26

## 2023-08-14 PROCEDURE — 99024 POSTOP FOLLOW-UP VISIT: CPT

## 2023-08-21 NOTE — ADDENDUM
[FreeTextEntry1] : This note was written by Kristi Peterson on 08/14/2023 acting solely as a scribe for Dr. Johnny Jones.  All medical record entries made by the Scribe were at my, Dr. Johnny Jones, direction and personally dictated by me on 08/14/2023. I have personally reviewed the chart and agree that the record accurately reflects my personal performance of the history, physical exam, assessment and plan.

## 2023-08-21 NOTE — HISTORY OF PRESENT ILLNESS
[6] : the patient reports pain that is 6/10 in severity [Clean/Dry/Intact] : clean, dry and intact [Healed] : healed [Neuro Intact] : an unremarkable neurological exam [Vascular Intact] : ~T peripheral vascular exam normal [Doing Well] : is doing well [Excellent Pain Control] : has excellent pain control [No Sign of Infection] : is showing no signs of infection [Sutures Removed] : sutures were removed [Steri-Strips Removed & Replaced] : steri-strips removed and replaced [Chills] : no chills [Fever] : no fever [Nausea] : no nausea [Vomiting] : no vomiting [Erythema] : not erythematous [Discharge] : absent of discharge [Swelling] : not swollen [Dehiscence] : not dehisced [de-identified] : 36 y/o female s/p left knee diagnostic arthroscopy with patellofemoral chondroplasty 8/2/23 [de-identified] : 38 y/o female s/p left knee diagnostic arthroscopy with patellofemoral chondroplasty. She is doing well. Taking Advil as needed. Denies post op complications.  [de-identified] : Left Knee Exam:  Skin: Incision(s) Clean, dry, intact,no drainage, healed  Inspection: Residual swelling, min residual effusion Pulses: 2+ DP/PT pulses  ROM: 0-135 degrees of flexion. No pain with deep knee flexion/extension. Tenderness: Diffuse  Stability: Stable  Strength: Intact Q/H/TA/GS/EHL  Neuro: Intact to light touch throughout [de-identified] : 38 y/o female s/p left knee diagnostic arthroscopy with patellofemoral chondroplasty.   The patient presents for the first postoperative visit. All intraoperative imaging was discussed in detail with the patient including the quality and nature of the meniscus as well as the quality of the chondral surfaces. Discussion was also centered upon the rehabilitation required following arthroscopy, and patient is agreeable to treatment plan.  Recommendations: 1. PT: WBAT, AAROM/AROM to tolerance with immediate full ROM as goal.  2. Therapeutic exercises: Quad/Hamstring sets, co-contractions, SLR, HEP. 3. Meds: Wean pain medication, transition to OTC NSAID's/tylenol as tolerated 4. Bracing: None  Follow up 4wks for re-evaluation.

## 2023-08-28 ENCOUNTER — TRANSCRIPTION ENCOUNTER (OUTPATIENT)
Age: 38
End: 2023-08-28

## 2023-09-11 ENCOUNTER — APPOINTMENT (OUTPATIENT)
Dept: ORTHOPEDIC SURGERY | Facility: CLINIC | Age: 38
End: 2023-09-11
Payer: MEDICAID

## 2023-09-11 DIAGNOSIS — Z98.890 OTHER SPECIFIED POSTPROCEDURAL STATES: ICD-10-CM

## 2023-09-11 DIAGNOSIS — M94.9 DISORDER OF CARTILAGE, UNSPECIFIED: ICD-10-CM

## 2023-09-11 DIAGNOSIS — M17.32 UNILATERAL POST-TRAUMATIC OSTEOARTHRITIS, LEFT KNEE: ICD-10-CM

## 2023-09-11 PROCEDURE — 99024 POSTOP FOLLOW-UP VISIT: CPT

## 2023-09-14 PROBLEM — M17.32 POST-TRAUMATIC OSTEOARTHRITIS OF LEFT KNEE: Status: ACTIVE | Noted: 2022-08-24

## 2023-09-14 PROBLEM — Z98.890 STATUS POST MENISCECTOMY: Status: ACTIVE | Noted: 2022-08-24

## 2023-09-14 PROBLEM — M94.9 CHONDRAL LESION: Status: ACTIVE | Noted: 2023-08-21

## 2023-09-20 ENCOUNTER — RX RENEWAL (OUTPATIENT)
Age: 38
End: 2023-09-20

## 2023-10-19 ENCOUNTER — RX RENEWAL (OUTPATIENT)
Age: 38
End: 2023-10-19

## 2024-03-26 ENCOUNTER — RX RENEWAL (OUTPATIENT)
Age: 39
End: 2024-03-26

## 2024-03-26 RX ORDER — NAPROXEN 500 MG/1
500 TABLET ORAL TWICE DAILY
Qty: 30 | Refills: 1 | Status: ACTIVE | COMMUNITY
Start: 2022-10-24 | End: 1900-01-01

## 2024-04-29 ENCOUNTER — RX RENEWAL (OUTPATIENT)
Age: 39
End: 2024-04-29

## 2025-06-19 ENCOUNTER — APPOINTMENT (OUTPATIENT)
Dept: ORTHOPEDIC SURGERY | Facility: CLINIC | Age: 40
End: 2025-06-19

## 2025-06-19 VITALS
HEART RATE: 96 BPM | HEIGHT: 63 IN | DIASTOLIC BLOOD PRESSURE: 78 MMHG | BODY MASS INDEX: 44.3 KG/M2 | SYSTOLIC BLOOD PRESSURE: 124 MMHG | OXYGEN SATURATION: 98 % | WEIGHT: 250 LBS

## 2025-06-19 PROCEDURE — 73562 X-RAY EXAM OF KNEE 3: CPT | Mod: LT

## 2025-06-19 PROCEDURE — 99213 OFFICE O/P EST LOW 20 MIN: CPT

## 2025-06-19 RX ORDER — HYALURONATE SODIUM 20 MG/2 ML
20 SYRINGE (ML) INTRAARTICULAR
Qty: 3 | Refills: 0 | Status: ACTIVE | COMMUNITY
Start: 2025-06-19

## 2025-06-23 ENCOUNTER — APPOINTMENT (OUTPATIENT)
Dept: ORTHOPEDIC SURGERY | Facility: CLINIC | Age: 40
End: 2025-06-23
Payer: SELF-PAY

## 2025-06-23 PROCEDURE — 20610 DRAIN/INJ JOINT/BURSA W/O US: CPT | Mod: LT

## 2025-06-30 ENCOUNTER — APPOINTMENT (OUTPATIENT)
Dept: ORTHOPEDIC SURGERY | Facility: CLINIC | Age: 40
End: 2025-06-30
Payer: SELF-PAY

## 2025-06-30 PROCEDURE — 20610 DRAIN/INJ JOINT/BURSA W/O US: CPT | Mod: LT

## 2025-07-07 ENCOUNTER — APPOINTMENT (OUTPATIENT)
Dept: ORTHOPEDIC SURGERY | Facility: CLINIC | Age: 40
End: 2025-07-07
Payer: MEDICAID

## 2025-07-07 PROCEDURE — 20610 DRAIN/INJ JOINT/BURSA W/O US: CPT | Mod: LT

## (undated) DEVICE — POSITIONER FOAM S&N INSERT FOR LEG HOLDER (WHITE)

## (undated) DEVICE — PACK KNEE ARTHROSCOPY

## (undated) DEVICE — SYR LUER LOK 50CC

## (undated) DEVICE — VENODYNE/SCD SLEEVE CALF MEDIUM

## (undated) DEVICE — NDL HYPO SAFE 21G X 1.5" (GREEN)

## (undated) DEVICE — NDL SPINAL 18G X 3.5" (PINK)

## (undated) DEVICE — TOURNIQUET CUFF 24" DUAL PORT SINGLE BLADDER LUER LOCK  (BLACK)

## (undated) DEVICE — POSITIONER PATIENT SAFETY STRAP 3X60"

## (undated) DEVICE — WARMING BLANKET UPPER ADULT

## (undated) DEVICE — ELCTR ROCKER SWITCH PENCIL BLUE 10FT

## (undated) DEVICE — DRSG ACE BANDAGE 6"

## (undated) DEVICE — TUBING DEPUY MITEK FMS INFLOW

## (undated) DEVICE — SUT ETHILON 3-0 18" FS-1

## (undated) DEVICE — BASIN SET SINGLE

## (undated) DEVICE — SOL IRR BAG NS 0.9% 3000ML

## (undated) DEVICE — POSITIONER FOAM EGG CRATE ULNAR 2PCS (PINK)

## (undated) DEVICE — Device

## (undated) DEVICE — TUBING DEPUY MITEK FMS OUTFLOW

## (undated) DEVICE — GLV 8 PROTEXIS (CREAM) NEU-THERA

## (undated) DEVICE — TOURNIQUET CUFF 34" DUAL PORT W PLC

## (undated) DEVICE — SHAVER BLADE LINVATEC FULL RADIUS RESECTOR 3.5MM